# Patient Record
Sex: FEMALE | Race: ASIAN | NOT HISPANIC OR LATINO | URBAN - METROPOLITAN AREA
[De-identification: names, ages, dates, MRNs, and addresses within clinical notes are randomized per-mention and may not be internally consistent; named-entity substitution may affect disease eponyms.]

---

## 2018-07-16 ENCOUNTER — OFFICE VISIT - HEALTHEAST (OUTPATIENT)
Dept: INTERNAL MEDICINE | Facility: CLINIC | Age: 28
End: 2018-07-16

## 2018-07-16 DIAGNOSIS — B36.0 TINEA VERSICOLOR: ICD-10-CM

## 2018-07-16 DIAGNOSIS — L30.9 DERMATITIS: ICD-10-CM

## 2018-07-16 ASSESSMENT — MIFFLIN-ST. JEOR: SCORE: 1265.99

## 2018-07-23 ENCOUNTER — COMMUNICATION - HEALTHEAST (OUTPATIENT)
Dept: INTERNAL MEDICINE | Facility: CLINIC | Age: 28
End: 2018-07-23

## 2018-08-09 ENCOUNTER — OFFICE VISIT - HEALTHEAST (OUTPATIENT)
Dept: INTERNAL MEDICINE | Facility: CLINIC | Age: 28
End: 2018-08-09

## 2018-08-09 DIAGNOSIS — E03.9 HYPOTHYROIDISM: ICD-10-CM

## 2018-08-09 DIAGNOSIS — E22.1 HYPERPROLACTINEMIA (H): ICD-10-CM

## 2018-08-09 LAB
PROLACTIN SERPL-MCNC: 57.1 NG/ML (ref 0–20)
T4 FREE SERPL-MCNC: 1.4 NG/DL (ref 0.7–1.8)
TSH SERPL DL<=0.005 MIU/L-ACNC: <0.01 UIU/ML (ref 0.3–5)

## 2018-08-10 ENCOUNTER — COMMUNICATION - HEALTHEAST (OUTPATIENT)
Dept: INTERNAL MEDICINE | Facility: CLINIC | Age: 28
End: 2018-08-10

## 2018-08-14 ENCOUNTER — COMMUNICATION - HEALTHEAST (OUTPATIENT)
Dept: INTERNAL MEDICINE | Facility: CLINIC | Age: 28
End: 2018-08-14

## 2018-09-11 ENCOUNTER — COMMUNICATION - HEALTHEAST (OUTPATIENT)
Dept: INTERNAL MEDICINE | Facility: CLINIC | Age: 28
End: 2018-09-11

## 2018-09-11 DIAGNOSIS — E03.9 HYPOTHYROIDISM: ICD-10-CM

## 2018-09-11 RX ORDER — LEVOTHYROXINE SODIUM 175 UG/1
175 TABLET ORAL DAILY
Qty: 90 TABLET | Refills: 3 | Status: SHIPPED | OUTPATIENT
Start: 2018-09-11

## 2021-06-01 VITALS — HEIGHT: 63 IN | WEIGHT: 125.2 LBS | BODY MASS INDEX: 22.18 KG/M2

## 2021-06-01 VITALS — BODY MASS INDEX: 22.67 KG/M2 | WEIGHT: 128 LBS

## 2021-06-16 PROBLEM — E03.9 HYPOTHYROIDISM: Status: ACTIVE | Noted: 2018-09-11

## 2021-06-19 NOTE — PROGRESS NOTES
" ASSESSMENT AND PLAN:    1. Dermatitis  Facial dermatitis is suggestive of milia.  Her dandruff problem has responded to ketoconazole shampoo.  I advised mild facial soap, and she is referred to dermatology.   - Ambulatory referral to Dermatology    2. Tinea versicolor  Likely an element of this on outer upper arms, possibly upper back is partially treated with ketoconazole shampoo.  She can use this as well on arms, and follow up with dermatology if fails to improve.     CHIEF COMPLAINT:  Chief Complaint   Patient presents with     Rash     x 3 days, is painful in the morning on face      HISTORY OF PRESENT ILLNESS:  Carrol Julian is a 27 y.o. female with dandruff, and dermatitis of her face and arms.  No itching, and dandruff has improved with ketoconazole shampoo.      REVIEW OF SYSTEMS:   See HPI, all other pertinent systems on review are negative.    Active Ambulatory Problems     Diagnosis Date Noted     No Active Ambulatory Problems     Resolved Ambulatory Problems     Diagnosis Date Noted     No Resolved Ambulatory Problems     Past Medical History:   Diagnosis Date     Hypothyroidism      VITALS:  Vitals:    07/16/18 1655   BP: 118/68   Patient Site: Left Arm   Patient Position: Sitting   Cuff Size: Adult Regular   Pulse: 70   SpO2: 98%   Weight: 125 lb 3.2 oz (56.8 kg)   Height: 5' 3.25\" (1.607 m)     Wt Readings from Last 3 Encounters:   07/16/18 125 lb 3.2 oz (56.8 kg)     PHYSICAL EXAM:  Constitutional:  Well appearing in NAD, alert and oriented  Skin:   Face has mild areas of milia.  Arms laterally and upper areas of faint tinea versicolor.      Current Outpatient Prescriptions   Medication Sig Dispense Refill     levothyroxine sodium (LEVOTHYROXINE ORAL) Take by mouth.       ketoconazole (NIZORAL) 2 % shampoo Apply to damp skin, lather, leave on 5 minutes, and rinse 120 mL 5     No current facility-administered medications for this visit.      Meliton Deleon MD  Internal Medicine  [facility] " [facility]

## 2021-06-19 NOTE — PROGRESS NOTES
ASSESSMENT AND PLAN:    1. Hypothyroidism  She has been taking 200 mcg po daily.  Will refill and take the function levels.   - Thyroid Stimulating Hormone (TSH)  - T4, Free  - Ambulatory referral to Endocrinology    2. Hyperprolactinemia (H)  History of mentrual irregularities and elevated prolactin.  Had brain MRI - no clear evidence of tumor.  I referred her to endocrinology and recommended Dr. Hannah Adorno.    - Prolactin  - Ambulatory referral to Endocrinology    This visit was for a total of 15 minutes face to face with the patient. Over 50% of this time was spent in care coordination, counseling and educating the patient about thyroid replacement and hyperprolactinemia.     CHIEF COMPLAINT:  Chief Complaint   Patient presents with     Medication Refill     HISTORY OF PRESENT ILLNESS:  Carrol Julian is a 27 y.o. female on thyroid replacement, 200 mcg daily.  She has history of elevated prolactin, but no pituitary tumor.  She feels OK, getting PhD in pharmacy.  Has had menstrual disorder that led to discovery of hormonal irregularies.      VITALS:  Vitals:    08/09/18 0950   BP: 100/64   Patient Site: Left Arm   Patient Position: Sitting   Cuff Size: Adult Regular   Pulse: 88   SpO2: 99%   Weight: 128 lb (58.1 kg)     Wt Readings from Last 3 Encounters:   08/09/18 128 lb (58.1 kg)   07/21/18 135 lb (61.2 kg)   07/16/18 125 lb 3.2 oz (56.8 kg)     PHYSICAL EXAM:  Constitutional:  Well appearing in NAD, alert and oriented    Current Outpatient Prescriptions   Medication Sig Dispense Refill     ketoconazole (NIZORAL) 2 % shampoo Apply to damp skin, lather, leave on 5 minutes, and rinse 120 mL 5     levothyroxine (SYNTHROID) 200 MCG tablet Take 1 tablet (200 mcg total) by mouth daily. 30 tablet 11     levothyroxine sodium (LEVOTHYROXINE ORAL) Take by mouth.       No current facility-administered medications for this visit.      Meliton Deleon MD  Internal Medicine  [facility]

## 2021-06-27 ENCOUNTER — HEALTH MAINTENANCE LETTER (OUTPATIENT)
Age: 31
End: 2021-06-27

## 2021-10-17 ENCOUNTER — HEALTH MAINTENANCE LETTER (OUTPATIENT)
Age: 31
End: 2021-10-17

## 2022-07-23 ENCOUNTER — HEALTH MAINTENANCE LETTER (OUTPATIENT)
Age: 32
End: 2022-07-23

## 2022-10-01 ENCOUNTER — HEALTH MAINTENANCE LETTER (OUTPATIENT)
Age: 32
End: 2022-10-01

## 2023-08-12 ENCOUNTER — HEALTH MAINTENANCE LETTER (OUTPATIENT)
Age: 33
End: 2023-08-12